# Patient Record
Sex: MALE | Race: BLACK OR AFRICAN AMERICAN | Employment: FULL TIME | URBAN - METROPOLITAN AREA
[De-identification: names, ages, dates, MRNs, and addresses within clinical notes are randomized per-mention and may not be internally consistent; named-entity substitution may affect disease eponyms.]

---

## 2017-02-02 ENCOUNTER — APPOINTMENT (OUTPATIENT)
Dept: CT IMAGING | Age: 47
End: 2017-02-02
Attending: PHYSICIAN ASSISTANT
Payer: COMMERCIAL

## 2017-02-02 ENCOUNTER — HOSPITAL ENCOUNTER (EMERGENCY)
Age: 47
Discharge: HOME | End: 2017-02-02
Attending: EMERGENCY MEDICINE
Payer: COMMERCIAL

## 2017-02-02 ENCOUNTER — APPOINTMENT (OUTPATIENT)
Dept: GENERAL RADIOLOGY | Age: 47
End: 2017-02-02
Attending: PHYSICIAN ASSISTANT
Payer: COMMERCIAL

## 2017-02-02 VITALS
TEMPERATURE: 97.9 F | OXYGEN SATURATION: 97 % | RESPIRATION RATE: 26 BRPM | SYSTOLIC BLOOD PRESSURE: 125 MMHG | DIASTOLIC BLOOD PRESSURE: 68 MMHG | HEART RATE: 100 BPM | WEIGHT: 189 LBS

## 2017-02-02 DIAGNOSIS — Z95.1 HX OF CABG: ICD-10-CM

## 2017-02-02 DIAGNOSIS — R07.89 OTHER CHEST PAIN: Primary | ICD-10-CM

## 2017-02-02 LAB
ALBUMIN SERPL BCP-MCNC: 3.2 G/DL (ref 3.5–5)
ALBUMIN/GLOB SERPL: 0.7 {RATIO} (ref 1.1–2.2)
ALP SERPL-CCNC: 77 U/L (ref 45–117)
ALT SERPL-CCNC: 222 U/L (ref 12–78)
ANION GAP BLD CALC-SCNC: 10 MMOL/L (ref 5–15)
AST SERPL W P-5'-P-CCNC: 68 U/L (ref 15–37)
BASOPHILS # BLD AUTO: 0 K/UL (ref 0–0.1)
BASOPHILS # BLD: 0 % (ref 0–1)
BILIRUB SERPL-MCNC: 0.3 MG/DL (ref 0.2–1)
BUN SERPL-MCNC: 15 MG/DL (ref 6–20)
BUN/CREAT SERPL: 15 (ref 12–20)
CALCIUM SERPL-MCNC: 9.1 MG/DL (ref 8.5–10.1)
CHLORIDE SERPL-SCNC: 107 MMOL/L (ref 97–108)
CO2 SERPL-SCNC: 27 MMOL/L (ref 21–32)
CREAT SERPL-MCNC: 0.98 MG/DL (ref 0.7–1.3)
EOSINOPHIL # BLD: 0 K/UL (ref 0–0.4)
EOSINOPHIL NFR BLD: 1 % (ref 0–7)
ERYTHROCYTE [DISTWIDTH] IN BLOOD BY AUTOMATED COUNT: 13.9 % (ref 11.5–14.5)
GLOBULIN SER CALC-MCNC: 4.4 G/DL (ref 2–4)
GLUCOSE SERPL-MCNC: 90 MG/DL (ref 65–100)
HCT VFR BLD AUTO: 43.1 % (ref 36.6–50.3)
HGB BLD-MCNC: 13.6 G/DL (ref 12.1–17)
LYMPHOCYTES # BLD AUTO: 36 % (ref 12–49)
LYMPHOCYTES # BLD: 1.7 K/UL (ref 0.8–3.5)
MAGNESIUM SERPL-MCNC: 2 MG/DL (ref 1.6–2.4)
MCH RBC QN AUTO: 28.5 PG (ref 26–34)
MCHC RBC AUTO-ENTMCNC: 31.6 G/DL (ref 30–36.5)
MCV RBC AUTO: 90.2 FL (ref 80–99)
MONOCYTES # BLD: 0.7 K/UL (ref 0–1)
MONOCYTES NFR BLD AUTO: 15 % (ref 5–13)
NEUTS SEG # BLD: 2.3 K/UL (ref 1.8–8)
NEUTS SEG NFR BLD AUTO: 48 % (ref 32–75)
PLATELET # BLD AUTO: 290 K/UL (ref 150–400)
POTASSIUM SERPL-SCNC: 4.3 MMOL/L (ref 3.5–5.1)
PROT SERPL-MCNC: 7.6 G/DL (ref 6.4–8.2)
RBC # BLD AUTO: 4.78 M/UL (ref 4.1–5.7)
SODIUM SERPL-SCNC: 144 MMOL/L (ref 136–145)
TROPONIN I SERPL-MCNC: 0.04 NG/ML
TROPONIN I SERPL-MCNC: <0.04 NG/ML
WBC # BLD AUTO: 4.7 K/UL (ref 4.1–11.1)

## 2017-02-02 PROCEDURE — 96360 HYDRATION IV INFUSION INIT: CPT

## 2017-02-02 PROCEDURE — 74011636320 HC RX REV CODE- 636/320: Performed by: RADIOLOGY

## 2017-02-02 PROCEDURE — 71275 CT ANGIOGRAPHY CHEST: CPT

## 2017-02-02 PROCEDURE — 93005 ELECTROCARDIOGRAM TRACING: CPT

## 2017-02-02 PROCEDURE — 74011250636 HC RX REV CODE- 250/636: Performed by: PHYSICIAN ASSISTANT

## 2017-02-02 PROCEDURE — 94762 N-INVAS EAR/PLS OXIMTRY CONT: CPT

## 2017-02-02 PROCEDURE — 36415 COLL VENOUS BLD VENIPUNCTURE: CPT | Performed by: PHYSICIAN ASSISTANT

## 2017-02-02 PROCEDURE — 84484 ASSAY OF TROPONIN QUANT: CPT | Performed by: PHYSICIAN ASSISTANT

## 2017-02-02 PROCEDURE — 80053 COMPREHEN METABOLIC PANEL: CPT | Performed by: PHYSICIAN ASSISTANT

## 2017-02-02 PROCEDURE — 83735 ASSAY OF MAGNESIUM: CPT | Performed by: PHYSICIAN ASSISTANT

## 2017-02-02 PROCEDURE — 99285 EMERGENCY DEPT VISIT HI MDM: CPT

## 2017-02-02 PROCEDURE — 96361 HYDRATE IV INFUSION ADD-ON: CPT

## 2017-02-02 PROCEDURE — 85025 COMPLETE CBC W/AUTO DIFF WBC: CPT | Performed by: PHYSICIAN ASSISTANT

## 2017-02-02 RX ADMIN — IOPAMIDOL 160 ML: 755 INJECTION, SOLUTION INTRAVENOUS at 21:20

## 2017-02-02 RX ADMIN — SODIUM CHLORIDE 1000 ML: 900 INJECTION, SOLUTION INTRAVENOUS at 20:28

## 2017-02-02 NOTE — LETTER
Adolfo Ross 
[facility] EMERGENCY DEPT 
[address] [place] [address] 
[phone] Work/School Note Date: 2/2/2017 To Whom It May concern: 
 
Shahrzad Serna was seen and treated today in the emergency room by the following provider(s): 
Attending Provider: Chiqui Celeste MD 
Physician Assistant: Dayanna Haley PA-C. Shahrzad Serna may return to work on 2/5/17.  
 
Sincerely, 
 
 
 
 
Dayanna Haley PA-C

## 2017-02-03 NOTE — ED PROVIDER NOTES
HPI Comments: Rufus Gross is a 42 y.o. male who presents ambulatory to the ED with a c/o chest discomfort for >1 week. Pt notes he was in [place] when his sx started while on the treadmill. He notes he felt more fatigued at the time, but had been told it was from the altitude. He has been back for several days, but his sx have not abated. Pt notes he usually has an elevated heart rate, but his is higher than normal. Pt reports being concerned as he had an mi with a cabg 11/2013. He notes he has not been on his cholesterol med for a year, but has been taking an asa daily. Pt notes his discomfort is \"not a pain or pressure. It feels like when someone surprises you and you get that sensation in your chest.\" he notes no n/v/d, sob, diaphoresis, radiation, or changes with exertion. He denies f/c, cough/ cold sx, abd pain or urinary sx. PCP: Moris Loaiza MD  Cardiology: Dr Dawit Escudero  PMHx significant for: Past Medical History:    MI (myocardial infarction) [facility])                            PSHx significant for: Past Surgical History:    HX CORONARY ARTERY BYPASS GRAFT                              Social Hx: Tobacco: denies  EtOH: social Illicit drug use: denies    There are no further complaints or symptoms at this time. The history is provided by the patient. Past Medical History:   Diagnosis Date    MI (myocardial infarction) [facility])        Past Surgical History:   Procedure Laterality Date    Hx coronary artery bypass graft           History reviewed. No pertinent family history. Social History     Social History    Marital status:      Spouse name: N/A    Number of children: N/A    Years of education: N/A     Occupational History    Not on file.      Social History Main Topics    Smoking status: Never Smoker    Smokeless tobacco: Not on file    Alcohol use Yes      Comment: occassional    Drug use: Not on file    Sexual activity: Not on file     Other Topics Concern    Not on file     Social History Narrative    No narrative on file         ALLERGIES: Review of patient's allergies indicates no known allergies. Review of Systems   Constitutional: Negative for chills and fever. HENT: Negative for congestion, rhinorrhea, sneezing and sore throat. Eyes: Negative for redness and visual disturbance. Respiratory: Negative for shortness of breath. Cardiovascular: Positive for chest pain. Negative for leg swelling. Gastrointestinal: Negative for abdominal pain, nausea and vomiting. Genitourinary: Negative for difficulty urinating and frequency. Musculoskeletal: Negative for back pain, myalgias and neck stiffness. Skin: Negative for rash. Neurological: Negative for dizziness, syncope, weakness and headaches. Hematological: Negative for adenopathy. Patient Vitals for the past 12 hrs:   Temp Pulse Resp BP SpO2   02/02/17 2315 - 100 26 125/68 97 %   02/02/17 2300 - (!) 102 28 133/84 97 %   02/02/17 2230 - (!) 107 28 129/65 97 %   02/02/17 2215 - (!) 106 24 139/70 98 %   02/02/17 2201 - (!) 103 27 126/59 98 %   02/02/17 2146 - - - 133/87 -   02/02/17 2045 - 95 23 128/78 99 %   02/02/17 1943 97.9 °F (36.6 °C) (!) 105 18 143/80 100 %              Physical Exam   Constitutional: He is oriented to person, place, and time. He appears well-developed and well-nourished. No distress. HENT:   Head: Normocephalic and atraumatic. Right Ear: External ear normal.   Left Ear: External ear normal.   Neck: Neck supple. Cardiovascular: Regular rhythm, normal heart sounds and intact distal pulses. Exam reveals no gallop and no friction rub. No murmur heard. tachycardia   Pulmonary/Chest: Effort normal and breath sounds normal. No stridor. No respiratory distress. He has no wheezes. He has no rales. He exhibits no tenderness. Abdominal: Soft. Bowel sounds are normal. He exhibits no distension and no mass. There is no tenderness. There is no rebound and no guarding. Musculoskeletal: Normal range of motion. He exhibits no edema, tenderness or deformity. Neurological: He is alert and oriented to person, place, and time. No cranial nerve deficit. Coordination normal.   Skin: No rash noted. No erythema. No pallor. Psychiatric: He has a normal mood and affect. His behavior is normal.   Nursing note and vitals reviewed. MDM  Number of Diagnoses or Management Options  Hx of CABG:   Other chest pain:      Amount and/or Complexity of Data Reviewed  Clinical lab tests: ordered and reviewed  Tests in the radiology section of CPT®: ordered and reviewed  Tests in the medicine section of CPT®: reviewed and ordered  Obtain history from someone other than the patient: yes (wife)  Review and summarize past medical records: yes  Independent visualization of images, tracings, or specimens: yes    Patient Progress  Patient progress: stable    ED Course       Procedures    ED EKG interpretation: 7:35 PM  Rhythm: sinus tachycardia, with 1st degree AV block and regular . Rate (approx.): 110; Axis: normal; P wave: normal; QRS interval: normal ; ST/T wave: ns changes in anterior leads; Other findings: abnormal ekg. This EKG was interpreted by Dr. Pfeiffer Records Provider. 8:32 PM  Discussed pt, sx, hx and current findings with Dr. Malu Connell. He is in agreement with plan  Pascual Andrews. JESSICA Trinidad      10:30 PM  I have just reevaluated the patient. I have reviewed His vital signs and determined there is currently no worsening in their condition or physical exam.  Results have been reviewed with them and their questions have been answered. We will continue to review further results as they come available. Will recheck troponin. Discussed neg CT findings. If neg, will discharge to f/u with cardiology  Pascual Andrews.  JESSICA Trinidad      LABORATORY TESTS:  Recent Results (from the past 12 hour(s))   CBC WITH AUTOMATED DIFF    Collection Time: 02/02/17  8:21 PM   Result Value Ref Range    WBC 4.7 4.1 - 11.1 K/uL    RBC 4.78 4.10 - 5.70 M/uL    HGB 13.6 12.1 - 17.0 g/dL    HCT 43.1 36.6 - 50.3 %    MCV 90.2 80.0 - 99.0 FL    MCH 28.5 26.0 - 34.0 PG    MCHC 31.6 30.0 - 36.5 g/dL    RDW 13.9 11.5 - 14.5 %    PLATELET 690 132 - 138 K/uL    NEUTROPHILS 48 32 - 75 %    LYMPHOCYTES 36 12 - 49 %    MONOCYTES 15 (H) 5 - 13 %    EOSINOPHILS 1 0 - 7 %    BASOPHILS 0 0 - 1 %    ABS. NEUTROPHILS 2.3 1.8 - 8.0 K/UL    ABS. LYMPHOCYTES 1.7 0.8 - 3.5 K/UL    ABS. MONOCYTES 0.7 0.0 - 1.0 K/UL    ABS. EOSINOPHILS 0.0 0.0 - 0.4 K/UL    ABS. BASOPHILS 0.0 0.0 - 0.1 K/UL   METABOLIC PANEL, COMPREHENSIVE    Collection Time: 02/02/17  8:21 PM   Result Value Ref Range    Sodium 144 136 - 145 mmol/L    Potassium 4.3 3.5 - 5.1 mmol/L    Chloride 107 97 - 108 mmol/L    CO2 27 21 - 32 mmol/L    Anion gap 10 5 - 15 mmol/L    Glucose 90 65 - 100 mg/dL    BUN 15 6 - 20 MG/DL    Creatinine 0.98 0.70 - 1.30 MG/DL    BUN/Creatinine ratio 15 12 - 20      GFR est AA >60 >60 ml/min/1.73m2    GFR est non-AA >60 >60 ml/min/1.73m2    Calcium 9.1 8.5 - 10.1 MG/DL    Bilirubin, total 0.3 0.2 - 1.0 MG/DL    ALT (SGPT) 222 (H) 12 - 78 U/L    AST (SGOT) 68 (H) 15 - 37 U/L    Alk. phosphatase 77 45 - 117 U/L    Protein, total 7.6 6.4 - 8.2 g/dL    Albumin 3.2 (L) 3.5 - 5.0 g/dL    Globulin 4.4 (H) 2.0 - 4.0 g/dL    A-G Ratio 0.7 (L) 1.1 - 2.2     MAGNESIUM    Collection Time: 02/02/17  8:21 PM   Result Value Ref Range    Magnesium 2.0 1.6 - 2.4 mg/dL   TROPONIN I    Collection Time: 02/02/17  8:21 PM   Result Value Ref Range    Troponin-I, Qt. <0.04 <0.05 ng/mL   TROPONIN I    Collection Time: 02/02/17 10:30 PM   Result Value Ref Range    Troponin-I, Qt. 0.04 <0.05 ng/mL       IMAGING RESULTS:  CTA CHEST W WO CONT   Final Result           Study Result      CT ANGIOGRAPHY CHEST. 2/2/2017 9:47 PM      INDICATION: Chest discomfort with recent flight to [place].  Evaluate for  pulmonary embolism.     COMPARISON: None.     TECHNIQUE: CT angiography of the chest was performed after the administration of  160 cc IV contrast (Isovue 370). 2 injections and 2 scans were performed. Coronal and sagittal, and coronal MIP reconstructions were performed. CT dose  reduction was achieved through use of a standardized protocol tailored for this  examination and automatic exposure control for dose modulation.     FINDINGS:  Despite repeat attempt, the contrast bolus is suboptimal, with only slightly  more contrast in the pulmonary arteries than in the systemic arteries on the  second (better) injection. There is no large central pulmonary embolism in the  main or lobar pulmonary arteries, and probably no segmental pulmonary embolism. No findings to suggest right heart strain. The segmental and subsegmental  pulmonary arteries are suboptimally evaluated.     The lungs are clear. The central airways are patent. No pneumothorax or pleural  effusion. Post CABG; left anterior descending coronary calcifications are  extensive for age. A small amount of soft tissue in the anterior mediastinum  likely represents residual or reactive thymus. No thoracic lymphadenopathy. The  heart size is normal. A tiny subcentimeter hypodensity in segment 2 of the liver  is too small to characterize, but likely represents a cyst. The visualized upper  abdomen is otherwise normal.     IMPRESSION  IMPRESSION: No large pulmonary embolism. Clear lungs. Post CABG, with extensive  LAD calcifications for age. MEDICATIONS GIVEN:  Medications   iopamidol (ISOVUE-370) 76 % injection (not administered)   sodium chloride 0.9 % bolus infusion 1,000 mL (0 mL IntraVENous IV Completed 2/2/17 2235)   iopamidol (ISOVUE-370) 76 % injection 100 mL (160 mL IntraVENous Given 2/2/17 2120)       IMPRESSION:  1. Other chest pain    2. Hx of CABG        PLAN:  1. There are no discharge medications for this patient.     2.   Follow-up Information     Follow up With Details Comments Contact Info    Amie Lyon MD  2-3 days for [facility]  [phone]          Return to ED if worse     11:26 PM  Pt has been reexamined. Pt has no new complaints, changes or physical findings. Care plan outlined and precautions discussed. All available results were reviewed with pt. All medications were reviewed with pt. All of pt's questions and concerns were addressed. Pt agrees to F/U as instructed and agrees to return to ED upon further deterioration. Pt is ready to go home.   Luisa Montes PA-C

## 2017-02-03 NOTE — ED TRIAGE NOTES
Past HX of MI . Complaining of substernal chest pain for 1 week. Pain is worse at night. Denies SOB, N/V. Denies radiation to bilateral upper extremities, back or jaw.

## 2017-02-03 NOTE — ED NOTES
Patient up for discharge. IV to right AC removed. Vital signs updated. Awaiting discharge paperwork from provider.

## 2017-02-03 NOTE — DISCHARGE INSTRUCTIONS
Chest Pain: Care Instructions  Your Care Instructions  There are many things that can cause chest pain. Some are not serious and will get better on their own in a few days. But some kinds of chest pain need more testing and treatment. Your doctor may have recommended a follow-up visit in the next 8 to 12 hours. If you are not getting better, you may need more tests or treatment. Even though your doctor has released you, you still need to watch for any problems. The doctor carefully checked you, but sometimes problems can develop later. If you have new symptoms or if your symptoms do not get better, get medical care right away. If you have worse or different chest pain or pressure that lasts more than 5 minutes or you passed out (lost consciousness), call 911 or seek other emergency help right away. A medical visit is only one step in your treatment. Even if you feel better, you still need to do what your doctor recommends, such as going to all suggested follow-up appointments and taking medicines exactly as directed. This will help you recover and help prevent future problems. How can you care for yourself at home? · Rest until you feel better. · Take your medicine exactly as prescribed. Call your doctor if you think you are having a problem with your medicine. · Do not drive after taking a prescription pain medicine. When should you call for help? Call 911 if:  · You passed out (lost consciousness). · You have severe difficulty breathing. · You have symptoms of a heart attack. These may include:  ¨ Chest pain or pressure, or a strange feeling in your chest.  ¨ Sweating. ¨ Shortness of breath. ¨ Nausea or vomiting. ¨ Pain, pressure, or a strange feeling in your back, neck, jaw, or upper belly or in one or both shoulders or arms. ¨ Lightheadedness or sudden weakness. ¨ A fast or irregular heartbeat.   After you call 911, the  may tell you to chew 1 adult-strength or 2 to 4 low-dose aspirin. Wait for an ambulance. Do not try to drive yourself. Call your doctor today if:  · You have any trouble breathing. · Your chest pain gets worse. · You are dizzy or lightheaded, or you feel like you may faint. · You are not getting better as expected. · You are having new or different chest pain. Where can you learn more? Go to http://mary-lizett.info/. Enter A120 in the search box to learn more about \"Chest Pain: Care Instructions. \"  Current as of: May 27, 2016  Content Version: 11.1  © 1447-8725 [employer]. Care instructions adapted under license by [employer] (which disclaims liability or warranty for this information). If you have questions about a medical condition or this instruction, always ask your healthcare professional. [address] any warranty or liability for your use of this information. We hope that we have addressed all of your medical concerns. The examination and treatment you received in the Emergency Department were for an emergent problem and were not intended as complete care. It is important that you follow up with your healthcare provider(s) for ongoing care. If your symptoms worsen or do not improve as expected, and you are unable to reach your usual health care provider(s), you should return to the Emergency Department. Today's healthcare is undergoing tremendous change, and patient satisfaction surveys are one of the many tools to assess the quality of medical care. You may receive a survey from the [employer] organization regarding your experience in the Emergency Department. I hope that your experience has been completely positive, particularly the medical care that I provided. As such, please participate in the survey; anything less than excellent does not meet my expectations or intentions.         [facility] and [facility] participate in nationally recognized quality of care measures. If your blood pressure is greater than 120/80, as reported below, we urge that you seek medical care to address the potential of high blood pressure, commonly known as hypertension. Hypertension can be hereditary or can be caused by certain medical conditions, pain, stress, or \"white coat syndrome. \"       Please make an appointment with your health care provider(s) for follow up of your Emergency Department visit. VITALS:   Patient Vitals for the past 8 hrs:   Temp Pulse Resp BP SpO2   02/02/17 2315 - 100 26 125/68 97 %   02/02/17 2300 - (!) 102 28 133/84 97 %   02/02/17 2230 - (!) 107 28 129/65 97 %   02/02/17 2215 - (!) 106 24 139/70 98 %   02/02/17 2201 - (!) 103 27 126/59 98 %   02/02/17 2146 - - - 133/87 -   02/02/17 2045 - 95 23 128/78 99 %   02/02/17 1943 97.9 °F (36.6 °C) (!) 105 18 143/80 100 %          Thank you for allowing us to provide you with medical care today. We realize that you have many choices for your emergency care needs. Please choose us in the future for any continued health care needs. Paul Mancilla [facility], [address] [place], [place] [address].   Office: [phone]            Recent Results (from the past 24 hour(s))   CBC WITH AUTOMATED DIFF    Collection Time: 02/02/17  8:21 PM   Result Value Ref Range    WBC 4.7 4.1 - 11.1 K/uL    RBC 4.78 4.10 - 5.70 M/uL    HGB 13.6 12.1 - 17.0 g/dL    HCT 43.1 36.6 - 50.3 %    MCV 90.2 80.0 - 99.0 FL    MCH 28.5 26.0 - 34.0 PG    MCHC 31.6 30.0 - 36.5 g/dL    RDW 13.9 11.5 - 14.5 %    PLATELET 181 003 - 406 K/uL    NEUTROPHILS 48 32 - 75 %    LYMPHOCYTES 36 12 - 49 %    MONOCYTES 15 (H) 5 - 13 %    EOSINOPHILS 1 0 - 7 %    BASOPHILS 0 0 - 1 %    ABS. NEUTROPHILS 2.3 1.8 - 8.0 K/UL    ABS. LYMPHOCYTES 1.7 0.8 - 3.5 K/UL    ABS. MONOCYTES 0.7 0.0 - 1.0 K/UL    ABS. EOSINOPHILS 0.0 0.0 - 0.4 K/UL    ABS.  BASOPHILS 0.0 0.0 - 0.1 K/UL   METABOLIC PANEL, COMPREHENSIVE Collection Time: 02/02/17  8:21 PM   Result Value Ref Range    Sodium 144 136 - 145 mmol/L    Potassium 4.3 3.5 - 5.1 mmol/L    Chloride 107 97 - 108 mmol/L    CO2 27 21 - 32 mmol/L    Anion gap 10 5 - 15 mmol/L    Glucose 90 65 - 100 mg/dL    BUN 15 6 - 20 MG/DL    Creatinine 0.98 0.70 - 1.30 MG/DL    BUN/Creatinine ratio 15 12 - 20      GFR est AA >60 >60 ml/min/1.73m2    GFR est non-AA >60 >60 ml/min/1.73m2    Calcium 9.1 8.5 - 10.1 MG/DL    Bilirubin, total 0.3 0.2 - 1.0 MG/DL    ALT (SGPT) 222 (H) 12 - 78 U/L    AST (SGOT) 68 (H) 15 - 37 U/L    Alk. phosphatase 77 45 - 117 U/L    Protein, total 7.6 6.4 - 8.2 g/dL    Albumin 3.2 (L) 3.5 - 5.0 g/dL    Globulin 4.4 (H) 2.0 - 4.0 g/dL    A-G Ratio 0.7 (L) 1.1 - 2.2     MAGNESIUM    Collection Time: 02/02/17  8:21 PM   Result Value Ref Range    Magnesium 2.0 1.6 - 2.4 mg/dL   TROPONIN I    Collection Time: 02/02/17  8:21 PM   Result Value Ref Range    Troponin-I, Qt. <0.04 <0.05 ng/mL   TROPONIN I    Collection Time: 02/02/17 10:30 PM   Result Value Ref Range    Troponin-I, Qt. 0.04 <0.05 ng/mL       Cta Chest W Wo Cont    Result Date: 2/2/2017  CT ANGIOGRAPHY CHEST. 2/2/2017 9:47 PM INDICATION: Chest discomfort with recent flight to [employer]. Evaluate for pulmonary embolism. COMPARISON: None. TECHNIQUE: CT angiography of the chest was performed after the administration of 160 cc IV contrast (Isovue 370). 2 injections and 2 scans were performed. Coronal and sagittal, and coronal MIP reconstructions were performed. CT dose reduction was achieved through use of a standardized protocol tailored for this examination and automatic exposure control for dose modulation. FINDINGS: Despite repeat attempt, the contrast bolus is suboptimal, with only slightly more contrast in the pulmonary arteries than in the systemic arteries on the second (better) injection.  There is no large central pulmonary embolism in the main or lobar pulmonary arteries, and probably no segmental pulmonary embolism. No findings to suggest right heart strain. The segmental and subsegmental pulmonary arteries are suboptimally evaluated. The lungs are clear. The central airways are patent. No pneumothorax or pleural effusion. Post CABG; left anterior descending coronary calcifications are extensive for age. A small amount of soft tissue in the anterior mediastinum likely represents residual or reactive thymus. No thoracic lymphadenopathy. The heart size is normal. A tiny subcentimeter hypodensity in segment 2 of the liver is too small to characterize, but likely represents a cyst. The visualized upper abdomen is otherwise normal.     IMPRESSION: No large pulmonary embolism. Clear lungs. Post CABG, with extensive LAD calcifications for age.

## 2017-02-04 LAB
ATRIAL RATE: 110 BPM
CALCULATED P AXIS, ECG09: 52 DEGREES
CALCULATED R AXIS, ECG10: 47 DEGREES
CALCULATED T AXIS, ECG11: 53 DEGREES
DIAGNOSIS, 93000: NORMAL
P-R INTERVAL, ECG05: 212 MS
Q-T INTERVAL, ECG07: 308 MS
QRS DURATION, ECG06: 96 MS
QTC CALCULATION (BEZET), ECG08: 416 MS
VENTRICULAR RATE, ECG03: 110 BPM

## 2017-02-07 ENCOUNTER — OFFICE VISIT (OUTPATIENT)
Dept: CARDIOLOGY CLINIC | Age: 47
End: 2017-02-07

## 2017-02-07 VITALS
SYSTOLIC BLOOD PRESSURE: 138 MMHG | RESPIRATION RATE: 16 BRPM | OXYGEN SATURATION: 99 % | BODY MASS INDEX: 27.25 KG/M2 | HEIGHT: 69 IN | HEART RATE: 119 BPM | WEIGHT: 184 LBS | DIASTOLIC BLOOD PRESSURE: 72 MMHG

## 2017-02-07 DIAGNOSIS — E78.5 DYSLIPIDEMIA: ICD-10-CM

## 2017-02-07 DIAGNOSIS — I25.10 CORONARY ARTERY DISEASE INVOLVING NATIVE CORONARY ARTERY OF NATIVE HEART WITHOUT ANGINA PECTORIS: Primary | ICD-10-CM

## 2017-02-07 RX ORDER — ROSUVASTATIN CALCIUM 10 MG/1
10 TABLET, COATED ORAL
Qty: 30 TAB | Refills: 5 | Status: SHIPPED | OUTPATIENT
Start: 2017-02-07

## 2017-02-07 RX ORDER — METOPROLOL SUCCINATE 25 MG/1
25 TABLET, EXTENDED RELEASE ORAL DAILY
Qty: 30 TAB | Refills: 5 | Status: SHIPPED | OUTPATIENT
Start: 2017-02-07

## 2017-02-07 RX ORDER — ASPIRIN 325 MG
325 TABLET ORAL DAILY
COMMUNITY

## 2017-02-07 NOTE — MR AVS SNAPSHOT
Visit Information Date & Time Provider Department Dept. Phone Encounter #  
 2/7/2017  2:40 PM Nathaniel Beck MD [facility]ie [facility] [phone] [id number] Follow-up Instructions Return in about 6 weeks (around 3/21/2017). Follow-up and Disposition History Your Appointments 2/15/2017  9:00 AM  
NUCLEAR MEDICINE with NUCLEAR, QIANA  
[facility] (SHIRLEY SCHEDULING) Appt Note: echo at 1pm per dr flanagan 1 day s-card  dx ht 5'9 wt 184 need bp ck [address] [facility] 600 [facility] [address]  
[phone]  
  
   
 [address] [address]  
  
    
 2/15/2017  1:00 PM  
[facility]  
[facility] ([address]) Appt Note: echo at 1pm per dr flanagan 1 day s-card  dx ht 5'9 wt 184 need bp ck [address] [facility] 600 [address] [facility]  
[phone]  
  
   
 [address] [address]  
  
    
 3/28/2017  2:00 PM  
ESTABLISHED PATIENT with Nathaniel Beck MD  
[facility] ([address] [place] [address] Road) Appt Note: 6 wk fu  
 320 [facility] [address] [place] [address]  
[address] [facility] Upcoming Health Maintenance Date Due DTaP/Tdap/Td series (1 - Tdap) 10/22/1993 INFLUENZA AGE 9 TO ADULT 8/1/2016 Allergies as of 2/7/2017  Review Complete On: 2/7/2017 By: Nathaniel Beck MD  
 No Known Allergies Current Immunizations  Never Reviewed No immunizations on file. Not reviewed this visit You Were Diagnosed With   
  
 Codes Comments Coronary artery disease involving native coronary artery of native heart without angina pectoris    -  Primary ICD-10-CM: I25.10 ICD-9-CM: 414.01 Dyslipidemia     ICD-10-CM: E78.5 ICD-9-CM: 272.4 Vitals BP Pulse Resp Height(growth percentile) Weight(growth percentile) SpO2  
 138/72 (BP 1 Location: Right arm, BP Patient Position: Sitting) (!) 119 16 5' 9\" (1.753 m) 184 lb (83.5 kg) 99% BMI Smoking Status 27.17 kg/m2 Never Smoker BMI and BSA Data Body Mass Index Body Surface Area  
 27.17 kg/m 2 2.02 m 2 Preferred Pharmacy Pharmacy Name Phone [facility] [address] [place] [address] [phone] Your Updated Medication List  
  
   
This list is accurate as of: 2/7/17  3:16 PM.  Always use your most recent med list.  
  
  
  
  
 aspirin 325 mg tablet Commonly known as:  ASPIRIN Take 325 mg by mouth daily. metoprolol succinate 25 mg XL tablet Commonly known as:  TOPROL-XL Take 1 Tab by mouth daily. rosuvastatin 10 mg tablet Commonly known as:  CRESTOR Take 1 Tab by mouth nightly. Prescriptions Sent to Pharmacy Refills  
 metoprolol succinate (TOPROL-XL) 25 mg XL tablet 5 Sig: Take 1 Tab by mouth daily. Class: Normal  
 Pharmacy: [address] Ph #: [phone] Route: Oral  
 rosuvastatin (CRESTOR) 10 mg tablet 5 Sig: Take 1 Tab by mouth nightly. Class: Normal  
 Pharmacy: [address] Ph #: [phone] Route: Oral  
  
We Performed the Following HEPATIC FUNCTION PANEL [53209 CPT(R)] LIPID PANEL [74948 CPT(R)] TSH 3RD GENERATION [34752 CPT(R)] Follow-up Instructions Return in about 6 weeks (around 3/21/2017). To-Do List   
 02/28/2017 ECHO:  2D ECHO COMPLETE ADULT (TTE) W OR WO CONTR   
  
 02/28/2017 ECG:  STRESS TEST [facility] Introducing [facility]! [facility]BL[employer]OM introduces [facility] patient portal. Now you can access parts of your medical record, email your doctor's office, and request medication refills online. 1. In your internet browser, go to [email or web address] 2. Click on the First Time User? Click Here link in the Sign In box. You will see the New Member Sign Up page. 3. Enter your [employer] Access Code exactly as it appears below. You will not need to use this code after youve completed the sign-up process. If you do not sign up before the expiration date, you must request a new code. · [employer] Access Code: [email or web address] Expires: 5/3/2017  9:27 PM 
 
4. Enter the last four digits of your Social Security Number (xxxx) and Date of Birth (mm/dd/yyyy) as indicated and click Submit. You will be taken to the next sign-up page. 5. Create a [employer] ID. This will be your [employer] login ID and cannot be changed, so think of one that is secure and easy to remember. 6. Create a [employer] password. You can change your password at any time. 7. Enter your Password Reset Question and Answer. This can be used at a later time if you forget your password. 8. Enter your e-mail address. You will receive e-mail notification when new information is available in [address] 9. Click Sign Up. You can now view and download portions of your medical record. 10. Click the Download Summary menu link to download a portable copy of your medical information. If you have questions, please visit the Frequently Asked Questions section of the [employer] website. Remember, [employer] is NOT to be used for urgent needs. For medical emergencies, dial 911. Now available from your iPhone and Android! Please provide this summary of care documentation to your next provider. If you have any questions after today's visit, please call [phone].

## 2017-02-07 NOTE — PROGRESS NOTES
Visit Vitals    /72 (BP 1 Location: Right arm, BP Patient Position: Sitting)    Pulse (!) 119    Resp 16    Ht 5' 9\" (1.753 m)    Wt 184 lb (83.5 kg)    SpO2 99%    BMI 27.17 kg/m2

## 2017-02-07 NOTE — PROGRESS NOTES
LAST OFFICE VISIT : Visit date not found        ICD-10-CM ICD-9-CM   1. Coronary artery disease involving native coronary artery of native heart without angina pectoris I25.10 414.01   2. Dyslipidemia E78.5 272.4            Manette Goldmann is a 42 y.o. male new patient referred for chest discomfort. Cardiac risk factors: male gender. I have personally obtained the history from the patient. HISTORY OF PRESENTING ILLNESS      Presented to ER on 2/2/17 for chest discomfort lasting for greater than 1 week followed in [place] in the past and was on a treadmill when he developed chest pain that was related to the altitude. Heart rate has been consistently elevated. CABG in 11/2013 (3 vessel). He had come off his cholesterol medications for 1 year but continued ASA. During workup in ER he had negative troponins and EKG that showed sinus tachycardia with first degree heart block of 212 ms. He reports that he has lost 16 lbs in the last 2 weeks with no explanation. He continues to experience intermittent chest discomfort. He denies smoking. He reports family history of heart issues in mother with MI and CHF. The patient denies shortness of breath, orthopnea, PND, LE edema, palpitations, syncope, presyncope or fatigue. ACTIVE PROBLEM LIST     There are no active problems to display for this patient.           PAST MEDICAL HISTORY     Past Medical History   Diagnosis Date    MI (myocardial infarction) ([address]            PAST SURGICAL HISTORY     Past Surgical History   Procedure Laterality Date    Hx coronary artery bypass graft            ALLERGIES     No Known Allergies       FAMILY HISTORY     Family History   Problem Relation Age of Onset    Heart Attack Mother     Heart Failure Mother     Diabetes Mother     Heart Surgery Mother     No Known Problems Father     negative for cardiac disease       SOCIAL HISTORY     Social History     Social History    Marital status:      Spouse name: N/A   [facility] Number of children: N/A    Years of education: N/A     Social History Main Topics    Smoking status: Never Smoker    Smokeless tobacco: Never Used    Alcohol use Yes      Comment: occassional    Drug use: None    Sexual activity: Not Asked     Other Topics Concern    None     Social History Narrative         MEDICATIONS     Current Outpatient Prescriptions   Medication Sig    aspirin (ASPIRIN) 325 mg tablet Take 325 mg by mouth daily.  metoprolol succinate (TOPROL-XL) 25 mg XL tablet Take 1 Tab by mouth daily.  rosuvastatin (CRESTOR) 10 mg tablet Take 1 Tab by mouth nightly. No current facility-administered medications for this visit. I have reviewed the nurses notes, vitals, problem list, allergy list, medical history, family, social history and medications. REVIEW OF SYMPTOMS      General: Pt denies excessive weight gain or loss. Pt is able to conduct ADL's  HEENT: Denies blurred vision, headaches, hearing loss, epistaxis and difficulty swallowing. Respiratory: Denies cough, congestion, shortness of breath, KAY, wheezing or stridor. Cardiovascular: Denies precordial pain, palpitations, edema or PND  Gastrointestinal: Denies poor appetite, indigestion, abdominal pain or blood in stool  Genitourinary: Denies hematuria, dysuria, increased urinary frequency  Musculoskeletal: Denies joint pain or swelling from muscles or joints  Neurologic: Denies tremor, paresthesias, headache, or sensory motor disturbance  Psychiatric: Denies confusion, insomnia, depression  Integumentray: Denies rash, itching or ulcers. Hematologic: Denies easy bruising, bleeding     PHYSICAL EXAMINATION      Vitals:    02/07/17 1434   BP: 138/72   Pulse: (!) 119   Resp: 16   SpO2: 99%   Weight: 184 lb (83.5 kg)   Height: 5' 9\" (1.753 m)     General: Well developed, in no acute distress.   HEENT: No jaundice, oral mucosa moist, no oral ulcers  Neck: Supple, no stiffness, no lymphadenopathy, supple  Heart: tachycardia Respiratory: Clear bilaterally x 4, no wheezing or rales  Abdomen:   Soft, non-tender, bowel sounds are active.   Extremities:  No edema, normal cap refill, no cyanosis. Musculoskeletal: No clubbing, no deformities  Neuro: A&Ox3, speech clear, gait stable, cooperative, no focal neurologic deficits  Skin: Skin color is normal. No rashes or lesions. Non diaphoretic, moist.  Vascular: 2+ pulses symmetric in all extremities        EKG:      DIAGNOSTIC DATA     1) Echocardiogram:  2) Stress testing:  3) EP studies( holter,loop, Pacemaker):  4) Cardiac catheterization:  5) Vascular studies:         LABORATORY DATA            Lab Results   Component Value Date/Time    WBC 4.7 02/02/2017 08:21 PM    HGB 13.6 02/02/2017 08:21 PM    HCT 43.1 02/02/2017 08:21 PM    PLATELET 295 38/47/2592 08:21 PM    MCV 90.2 02/02/2017 08:21 PM      Lab Results   Component Value Date/Time    Sodium 144 02/02/2017 08:21 PM    Potassium 4.3 02/02/2017 08:21 PM    Chloride 107 02/02/2017 08:21 PM    CO2 27 02/02/2017 08:21 PM    Anion gap 10 02/02/2017 08:21 PM    Glucose 90 02/02/2017 08:21 PM    BUN 15 02/02/2017 08:21 PM    Creatinine 0.98 02/02/2017 08:21 PM    BUN/Creatinine ratio 15 02/02/2017 08:21 PM    GFR est AA >60 02/02/2017 08:21 PM    GFR est non-AA >60 02/02/2017 08:21 PM    Calcium 9.1 02/02/2017 08:21 PM    Bilirubin, total 0.3 02/02/2017 08:21 PM    AST (SGOT) 68 02/02/2017 08:21 PM    Alk. phosphatase 77 02/02/2017 08:21 PM    Protein, total 7.6 02/02/2017 08:21 PM    Albumin 3.2 02/02/2017 08:21 PM    Globulin 4.4 02/02/2017 08:21 PM    A-G Ratio 0.7 02/02/2017 08:21 PM    ALT (SGPT) 222 02/02/2017 08:21 PM           ASSESSMENT/RECOMMENDATIONS:.      1. Chest discomfort/CAD  -come off significant amount of medications that he was on in past   -due to recurrene of symptoms will do exercise cardiolite to look at EF    2. Tachycardia   -will check TSH particularly due to his 16 lb weight loss    3.  Dyslipidemia  -started back on Crestor 10 mg per day  -check lipids in 8 weeks     4. Follow up in 6 weeks or PRN    Orders Placed This Encounter    TSH 3RD GENERATION    LIVER FUNCTION PANEL    LIPID PANEL    STRESS TEST CARDIOLITE, Clinic Performed     Standing Status:   Future     Standing Expiration Date:   8/7/2017     Order Specific Question:   Reason for Exam:     Answer:   chest discomfort    2D ECHO COMPLETE ADULT (TTE) W OR WO CONTR     Standing Status:   Future     Standing Expiration Date:   8/7/2017     Order Specific Question:   Reason for Exam:     Answer:   chest discomfort     Order Specific Question:   Contrast Enhancement (Bubble Study, Definity, Optison) may be used if criteria listed in established evidence-based protocol has been identified. Answer: Yes    aspirin (ASPIRIN) 325 mg tablet     Sig: Take 325 mg by mouth daily.  metoprolol succinate (TOPROL-XL) 25 mg XL tablet     Sig: Take 1 Tab by mouth daily. Dispense:  30 Tab     Refill:  5    rosuvastatin (CRESTOR) 10 mg tablet     Sig: Take 1 Tab by mouth nightly. Dispense:  30 Tab     Refill:  5        Follow-up Disposition: Not on File      I have discussed the diagnosis with  Irma Joshi and the intended plan as seen in the above orders. Questions were answered concerning future plans. I have discussed medication side effects and warnings with the patient as well. Thank you,  No primary care provider on file. for involving me in the care of  Irma Joshi. Please do not hesitate to contact me for further questions/concerns. This note was written by marnie Agrawal, as dictated by Ramona Garcias MD.      Emperatriz Moreira. MD Billy, [facility] [address]      [facility], [address] [place] [address]     Zander [facility]Dign[facility]      [phone] / [phone] Fax

## 2017-02-08 LAB — TSH SERPL DL<=0.005 MIU/L-ACNC: <0.006 UIU/ML (ref 0.45–4.5)

## 2017-02-13 ENCOUNTER — TELEPHONE (OUTPATIENT)
Dept: CARDIOLOGY CLINIC | Age: 47
End: 2017-02-13

## 2017-02-13 NOTE — TELEPHONE ENCOUNTER
Dr Federico Young asked that I contact the pt for him. We need to know the name of his PCP, his TSH level is low. I l/m on his mobile number for a call back. This line ID him.

## 2017-02-13 NOTE — TELEPHONE ENCOUNTER
Richrd Driver, MD Errol Dakin, RN        Caller: Unspecified (Today, 11:51 AM)                     Yes, Please let hiim know his TSH is low and he needs to see primary care ASAP.

## 2017-02-15 ENCOUNTER — CLINICAL SUPPORT (OUTPATIENT)
Dept: CARDIOLOGY CLINIC | Age: 47
End: 2017-02-15

## 2017-02-15 DIAGNOSIS — I25.10 CORONARY ARTERY DISEASE INVOLVING NATIVE CORONARY ARTERY OF NATIVE HEART WITHOUT ANGINA PECTORIS: ICD-10-CM

## 2017-02-15 DIAGNOSIS — E78.5 DYSLIPIDEMIA: ICD-10-CM

## 2017-02-15 DIAGNOSIS — R07.9 CHEST PAIN, UNSPECIFIED TYPE: Primary | ICD-10-CM

## 2017-02-16 LAB
ALBUMIN SERPL-MCNC: 3.5 G/DL (ref 3.5–5.5)
ALP SERPL-CCNC: 80 IU/L (ref 39–117)
ALT SERPL-CCNC: 114 IU/L (ref 0–44)
AST SERPL-CCNC: 53 IU/L (ref 0–40)
BILIRUB DIRECT SERPL-MCNC: 0.11 MG/DL (ref 0–0.4)
BILIRUB SERPL-MCNC: 0.4 MG/DL (ref 0–1.2)
CHOLEST SERPL-MCNC: 218 MG/DL (ref 100–199)
HDLC SERPL-MCNC: 37 MG/DL
INTERPRETATION, 910389: NORMAL
LDLC SERPL CALC-MCNC: 160 MG/DL (ref 0–99)
PROT SERPL-MCNC: 6.8 G/DL (ref 6–8.5)
TRIGL SERPL-MCNC: 103 MG/DL (ref 0–149)
VLDLC SERPL CALC-MCNC: 21 MG/DL (ref 5–40)

## 2017-02-28 ENCOUNTER — TELEPHONE (OUTPATIENT)
Dept: CARDIOLOGY CLINIC | Age: 47
End: 2017-02-28

## 2017-02-28 NOTE — TELEPHONE ENCOUNTER
Dr. Neo Parikh spoke to pt about abnormal stress. Pt is in [place] for 3 month for work. Mailed his records to him so he can get evaluation in [place] while there.

## 2017-03-10 ENCOUNTER — DOCUMENTATION ONLY (OUTPATIENT)
Dept: CARDIOLOGY CLINIC | Age: 47
End: 2017-03-10

## 2017-03-10 NOTE — PROGRESS NOTES
Phone Call    Attempted to reach patient on work number as the long distance number was not going through.  I Left a message on his phone at work to call me to discuss the results of stress test.    Rubens Souza MD